# Patient Record
Sex: FEMALE | Race: WHITE | NOT HISPANIC OR LATINO | URBAN - METROPOLITAN AREA
[De-identification: names, ages, dates, MRNs, and addresses within clinical notes are randomized per-mention and may not be internally consistent; named-entity substitution may affect disease eponyms.]

---

## 2023-03-14 ENCOUNTER — TELEPHONE (OUTPATIENT)
Dept: PRIMARY CARE | Facility: CLINIC | Age: 28
End: 2023-03-14
Payer: COMMERCIAL

## 2023-03-15 RX ORDER — HYDROCORTISONE 25 MG/G
CREAM TOPICAL
COMMUNITY
Start: 2023-02-13 | End: 2023-07-17

## 2023-03-15 RX ORDER — TRAZODONE HYDROCHLORIDE 50 MG/1
50 TABLET ORAL NIGHTLY
COMMUNITY
Start: 2023-02-13 | End: 2023-07-17

## 2023-03-15 RX ORDER — ESCITALOPRAM OXALATE 5 MG/1
1 TABLET ORAL DAILY
COMMUNITY
Start: 2023-02-13 | End: 2023-06-16

## 2023-06-16 ENCOUNTER — TELEPHONE (OUTPATIENT)
Dept: PRIMARY CARE | Facility: CLINIC | Age: 28
End: 2023-06-16
Payer: COMMERCIAL

## 2023-06-16 DIAGNOSIS — F41.1 GENERALIZED ANXIETY DISORDER: ICD-10-CM

## 2023-06-16 RX ORDER — ESCITALOPRAM OXALATE 5 MG/1
TABLET ORAL
Qty: 90 TABLET | Refills: 0 | Status: SHIPPED | OUTPATIENT
Start: 2023-06-16 | End: 2023-07-17 | Stop reason: SDUPTHER

## 2023-06-16 NOTE — TELEPHONE ENCOUNTER
Patient was seen in February and told to follow up in 6 weeks because we started her on a new medication.  I do not see a follow up appointment but she keeps calling for refills.  She cannot have anymore refills unless she follows up.

## 2023-07-17 ENCOUNTER — OFFICE VISIT (OUTPATIENT)
Dept: PRIMARY CARE | Facility: CLINIC | Age: 28
End: 2023-07-17
Payer: COMMERCIAL

## 2023-07-17 VITALS
TEMPERATURE: 98.5 F | SYSTOLIC BLOOD PRESSURE: 116 MMHG | RESPIRATION RATE: 16 BRPM | OXYGEN SATURATION: 98 % | HEART RATE: 78 BPM | DIASTOLIC BLOOD PRESSURE: 74 MMHG | BODY MASS INDEX: 22.49 KG/M2 | HEIGHT: 65 IN | WEIGHT: 135 LBS

## 2023-07-17 DIAGNOSIS — F41.1 GENERALIZED ANXIETY DISORDER: ICD-10-CM

## 2023-07-17 DIAGNOSIS — F41.9 ANXIETY: Primary | ICD-10-CM

## 2023-07-17 LAB
ALANINE AMINOTRANSFERASE (SGPT) (U/L) IN SER/PLAS: 10 U/L (ref 7–45)
ALBUMIN (G/DL) IN SER/PLAS: 4.6 G/DL (ref 3.4–5)
ALKALINE PHOSPHATASE (U/L) IN SER/PLAS: 62 U/L (ref 33–110)
ANION GAP IN SER/PLAS: 11 MMOL/L (ref 10–20)
ASPARTATE AMINOTRANSFERASE (SGOT) (U/L) IN SER/PLAS: 16 U/L (ref 9–39)
BILIRUBIN TOTAL (MG/DL) IN SER/PLAS: 0.6 MG/DL (ref 0–1.2)
CALCIUM (MG/DL) IN SER/PLAS: 9.4 MG/DL (ref 8.6–10.3)
CARBON DIOXIDE, TOTAL (MMOL/L) IN SER/PLAS: 28 MMOL/L (ref 21–32)
CHLORIDE (MMOL/L) IN SER/PLAS: 104 MMOL/L (ref 98–107)
CREATININE (MG/DL) IN SER/PLAS: 0.74 MG/DL (ref 0.5–1.05)
ERYTHROCYTE DISTRIBUTION WIDTH (RATIO) BY AUTOMATED COUNT: 12.9 % (ref 11.5–14.5)
ERYTHROCYTE MEAN CORPUSCULAR HEMOGLOBIN CONCENTRATION (G/DL) BY AUTOMATED: 33.3 G/DL (ref 32–36)
ERYTHROCYTE MEAN CORPUSCULAR VOLUME (FL) BY AUTOMATED COUNT: 93 FL (ref 80–100)
ERYTHROCYTES (10*6/UL) IN BLOOD BY AUTOMATED COUNT: 4.54 X10E12/L (ref 4–5.2)
GFR FEMALE: >90 ML/MIN/1.73M2
GLUCOSE (MG/DL) IN SER/PLAS: 79 MG/DL (ref 74–99)
HEMATOCRIT (%) IN BLOOD BY AUTOMATED COUNT: 42.3 % (ref 36–46)
HEMOGLOBIN (G/DL) IN BLOOD: 14.1 G/DL (ref 12–16)
LEUKOCYTES (10*3/UL) IN BLOOD BY AUTOMATED COUNT: 6.2 X10E9/L (ref 4.4–11.3)
PLATELETS (10*3/UL) IN BLOOD AUTOMATED COUNT: 235 X10E9/L (ref 150–450)
POTASSIUM (MMOL/L) IN SER/PLAS: 3.7 MMOL/L (ref 3.5–5.3)
PROTEIN TOTAL: 7.8 G/DL (ref 6.4–8.2)
SODIUM (MMOL/L) IN SER/PLAS: 139 MMOL/L (ref 136–145)
THYROTROPIN (MIU/L) IN SER/PLAS BY DETECTION LIMIT <= 0.05 MIU/L: 1.03 MIU/L (ref 0.44–3.98)
UREA NITROGEN (MG/DL) IN SER/PLAS: 12 MG/DL (ref 6–23)

## 2023-07-17 PROCEDURE — 99214 OFFICE O/P EST MOD 30 MIN: CPT | Performed by: FAMILY MEDICINE

## 2023-07-17 RX ORDER — ESCITALOPRAM OXALATE 5 MG/1
5 TABLET ORAL DAILY
Qty: 90 TABLET | Refills: 0 | Status: SHIPPED | OUTPATIENT
Start: 2023-07-17 | End: 2023-09-11 | Stop reason: SDUPTHER

## 2023-07-17 RX ORDER — HYDROXYZINE HYDROCHLORIDE 25 MG/1
25 TABLET, FILM COATED ORAL 2 TIMES DAILY PRN
Qty: 60 TABLET | Refills: 1 | Status: SHIPPED | OUTPATIENT
Start: 2023-07-17 | End: 2023-09-15
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
## 2023-09-11 ENCOUNTER — OFFICE VISIT (OUTPATIENT)
Dept: PRIMARY CARE | Facility: CLINIC | Age: 28
End: 2023-09-11
Payer: COMMERCIAL

## 2023-09-11 VITALS
HEART RATE: 78 BPM | WEIGHT: 136.4 LBS | BODY MASS INDEX: 22.7 KG/M2 | OXYGEN SATURATION: 98 % | TEMPERATURE: 98.4 F | DIASTOLIC BLOOD PRESSURE: 78 MMHG | RESPIRATION RATE: 18 BRPM | SYSTOLIC BLOOD PRESSURE: 118 MMHG

## 2023-09-11 DIAGNOSIS — F41.1 GENERALIZED ANXIETY DISORDER: ICD-10-CM

## 2023-09-11 DIAGNOSIS — K59.09 CHRONIC CONSTIPATION: Primary | ICD-10-CM

## 2023-09-11 PROCEDURE — 99214 OFFICE O/P EST MOD 30 MIN: CPT | Performed by: FAMILY MEDICINE

## 2023-09-11 PROCEDURE — 1036F TOBACCO NON-USER: CPT | Performed by: FAMILY MEDICINE

## 2023-09-11 RX ORDER — ESCITALOPRAM OXALATE 5 MG/1
7.5 TABLET ORAL DAILY
Qty: 90 TABLET | Refills: 0 | Status: SHIPPED | OUTPATIENT
Start: 2023-09-11 | End: 2023-11-03

## 2023-09-11 ASSESSMENT — PATIENT HEALTH QUESTIONNAIRE - PHQ9
SUM OF ALL RESPONSES TO PHQ9 QUESTIONS 1 AND 2: 0
2. FEELING DOWN, DEPRESSED OR HOPELESS: NOT AT ALL
1. LITTLE INTEREST OR PLEASURE IN DOING THINGS: NOT AT ALL

## 2023-09-11 NOTE — PROGRESS NOTES
"Subjective   Patient ID: Melany Rodríguez is a 28 y.o. female who presents for Anxiety (Per patient anxiety is horrible at work, but decent at home.).    She states that the hydroxyzine was added last time because she gets nauseous with the lexapro.  The nausea has subsided a little.  The anxiety is not improved.  She has gone off on her own with work now.  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  It's very stressful although she performs well.  She doesn't use the hydroxyzine much she gets nervous to take anything.  If she does take it she takes it at night.  She has been on \"everything\" effexor worked the best but she couldn't keep taking it because of brain zaps.  She tried cymbalta as well.  That made her nauseous but the effexor didn't.  She isn't very nauseous taking the lexapro.      A new issue that is going on now is that she has a history of hemorrhoids and constipation.  Now her stool is very thin/skinny.  She is jules if she goes once every 3 days.  She has tried miralax but if she does take it she will go the following day.  She has not tried linzess.    Anxiety             Review of Systems    Objective   /78 (BP Location: Right arm, Patient Position: Sitting)   Pulse 78   Temp 36.9 °C (98.4 °F) (Temporal)   Resp 18   Wt 61.9 kg (136 lb 6.4 oz)   LMP 08/15/2023   SpO2 98%   BMI 22.70 kg/m²     Physical Exam  Constitutional:       Appearance: Normal appearance.   HENT:      Head: Normocephalic and atraumatic.      Mouth/Throat:      Mouth: Mucous membranes are moist.   Eyes:      Pupils: Pupils are equal, round, and reactive to light.   Cardiovascular:      Rate and Rhythm: Normal rate and regular rhythm.      Pulses: Normal pulses.   Pulmonary:      Effort: Pulmonary effort is normal.      Breath sounds: Normal breath sounds.   Abdominal:      General: Bowel sounds are normal. There is no distension.      Palpations: There is no mass.      Tenderness: " There is no abdominal tenderness. There is no guarding.   Musculoskeletal:      Cervical back: Normal range of motion and neck supple.   Neurological:      Mental Status: She is alert.         Assessment/Plan   Diagnoses and all orders for this visit:  Chronic constipation  -     XR abdomen 2 views supine and decubitus; Future  -     linaCLOtide (Linzess) 72 mcg capsule; Take 1 capsule (72 mcg) by mouth once daily in the morning. Take before meals. Do not crush or chew.  Generalized anxiety disorder  -     escitalopram (Lexapro) 5 mg tablet; Take 1.5 tablets (7.5 mg) by mouth once daily.

## 2023-09-11 NOTE — PATIENT INSTRUCTIONS
Today we have addressed your anxiety and you will increase your lexapro to 7.5mg daily, and you can continue hydroxyzine prn.     For your chronic constipation we reviewed your labs, I will order an abdominal xray and I have started you on linzess as long as the xray is normal.     Congratulations on your recent engagement!    Follow up in 6 months for a medication check

## 2023-10-27 ENCOUNTER — ANCILLARY PROCEDURE (OUTPATIENT)
Dept: RADIOLOGY | Facility: CLINIC | Age: 28
End: 2023-10-27
Payer: COMMERCIAL

## 2023-10-27 DIAGNOSIS — K59.09 CHRONIC CONSTIPATION: ICD-10-CM

## 2023-10-27 PROCEDURE — 74019 RADEX ABDOMEN 2 VIEWS: CPT | Performed by: STUDENT IN AN ORGANIZED HEALTH CARE EDUCATION/TRAINING PROGRAM

## 2023-10-27 PROCEDURE — 74019 RADEX ABDOMEN 2 VIEWS: CPT

## 2023-12-11 ENCOUNTER — APPOINTMENT (OUTPATIENT)
Dept: PRIMARY CARE | Facility: CLINIC | Age: 28
End: 2023-12-11
Payer: COMMERCIAL

## 2023-12-29 ENCOUNTER — OFFICE VISIT (OUTPATIENT)
Dept: GASTROENTEROLOGY | Facility: CLINIC | Age: 28
End: 2023-12-29
Payer: COMMERCIAL

## 2023-12-29 DIAGNOSIS — R19.4 CHANGE IN BOWEL HABIT: ICD-10-CM

## 2024-01-17 NOTE — PROGRESS NOTES
"  Melany Rodríguez is a 28 y.o. female with past medical history of anxiety who is referred by PCP Dr. Selena Parker for change in bowels. She reports a longstanding history of constipation for about 10 years. It does not matter what she eats. She has trouble with bowel movements. Associated with abdominal discomfort. Abdominal x-ray 9/2023 showed moderate stool burden. Since August 2023, symptoms have been worse and stools appear much thinner. At its worst about 2 months ago was having bowel movement every 4 days. Was on Miralax at the time. She was prescribed Linzess 72 mcg but only took this once as it caused \"upset stomach\". She states she gets anxiety with medications and does not really like taking them. Unsure if the upset stomach was due to her anxiety or the Linzess. Currently moving bowels every 2 days. Stools sometimes looser than usual. She does not see blood but has external hemorrhoids. She has abdominal pain about 2-3 days out of the week. Pretty constant bloating that is tender. Lots of gas. No nausea or vomiting. Infrequent heartburn. No unintentional weight loss.     Tries to cook meals at home but eats out on weekends. Lots of gluten and dairy. Eats lots of fruits and vegetables. Not much sugar/desserts. Drinks water primarily. Coffee 3 times per week. Not much soda and no energy drinks.    Recent labs reviewed. There is no iron deficiency anemia. CMP unremarkable. TSH normal. She does not take NSAIDS regularly. She has anxiety and was on Lexapro but has not been taking this for 2 months due to nausea. No prior EGD or colonoscopy.    Social history: Engaged.    Family history: Paternal grandfather had ostomy but she does not know why. Denies known family history of colon cancer or other GI disorders or malignancy.     No past medical history on file.    No past surgical history on file.    Current Outpatient Medications   Medication Sig Dispense Refill    copper (Paragard) 380 square mm IUD 1 " each by intrauterine route 1 time.      hydrOXYzine HCL (Atarax) 25 mg tablet Take 1 tablet (25 mg) by mouth 2 times a day as needed for anxiety. 60 tablet 1     No current facility-administered medications for this visit.     No Known Allergies    No family history on file.    Review of Systems  Review of Systems negative except as noted in HPI.    Objective   Unable to perform physical exam due to telemedicine visit.    Assessment/Plan     28 y.o. female with history of anxiety who presents today for initial clinic visit for fluctuating bowels tending towards constipation with abdominal pain with recent worsening of symptoms. This is worse with anxiety and she has been off of her Lexapro for 2 months. Recent labs reassuring with no iron deficiency anemia and normal TSH. Suspect she has underlying irritable bowel syndrome but will exclude other etiologies.    Recommendations  Try to cut back on dairy.  I suspect her anxiety is playing a role and I have asked her to follow up as she may need to go back on medication for this.  Obtain celiac serologies.  Obtain fecal calprotectin. If elevated will need colonoscopy. This was briefly discussed today.  Obtain breath test to exclude SIBO due to her significant bloating. She is at risk for this due to her chronic constipation.  Start fiber supplement daily (Benefiber, Metamucil).  Follow up in 8 weeks, or sooner if needed.    I performed this visit using real-time telehealth tools, including audio/video connection between patient at their home and myself at home.     Electronically signed by: Mellissa Cook CNP on 1/19/2024 at 11:49 AM

## 2024-01-19 ENCOUNTER — TELEMEDICINE (OUTPATIENT)
Dept: GASTROENTEROLOGY | Facility: CLINIC | Age: 29
End: 2024-01-19
Payer: COMMERCIAL

## 2024-01-19 ENCOUNTER — APPOINTMENT (OUTPATIENT)
Dept: INFUSION THERAPY | Facility: CLINIC | Age: 29
End: 2024-01-19
Payer: COMMERCIAL

## 2024-01-19 DIAGNOSIS — R14.0 BLOATING: ICD-10-CM

## 2024-01-19 DIAGNOSIS — R10.9 ABDOMINAL PAIN, UNSPECIFIED ABDOMINAL LOCATION: ICD-10-CM

## 2024-01-19 DIAGNOSIS — K59.09 CHRONIC CONSTIPATION: Primary | ICD-10-CM

## 2024-01-19 DIAGNOSIS — R19.4 CHANGE IN BOWEL HABITS: ICD-10-CM

## 2024-01-19 PROCEDURE — 99204 OFFICE O/P NEW MOD 45 MIN: CPT | Performed by: NURSE PRACTITIONER

## 2024-01-22 ENCOUNTER — LAB (OUTPATIENT)
Dept: LAB | Facility: LAB | Age: 29
End: 2024-01-22
Payer: COMMERCIAL

## 2024-01-22 DIAGNOSIS — K59.09 CHRONIC CONSTIPATION: ICD-10-CM

## 2024-01-22 DIAGNOSIS — R14.0 BLOATING: ICD-10-CM

## 2024-01-22 DIAGNOSIS — R19.4 CHANGE IN BOWEL HABITS: ICD-10-CM

## 2024-01-22 DIAGNOSIS — R10.9 ABDOMINAL PAIN, UNSPECIFIED ABDOMINAL LOCATION: ICD-10-CM

## 2024-01-22 LAB
GLIADIN PEPTIDE IGA SER IA-ACNC: <1 U/ML
TTG IGA SER IA-ACNC: <1 U/ML

## 2024-01-22 PROCEDURE — 83516 IMMUNOASSAY NONANTIBODY: CPT

## 2024-01-22 PROCEDURE — 36415 COLL VENOUS BLD VENIPUNCTURE: CPT

## 2024-01-25 LAB
GLIADIN PEPTIDE IGG SER IA-ACNC: <0.56 FLU (ref 0–4.99)
TTG IGG SER IA-ACNC: <0.82 FLU (ref 0–4.99)

## 2024-01-26 ENCOUNTER — LAB (OUTPATIENT)
Dept: LAB | Facility: LAB | Age: 29
End: 2024-01-26
Payer: COMMERCIAL

## 2024-01-26 DIAGNOSIS — R19.4 CHANGE IN BOWEL HABITS: ICD-10-CM

## 2024-01-26 DIAGNOSIS — R10.9 ABDOMINAL PAIN, UNSPECIFIED ABDOMINAL LOCATION: ICD-10-CM

## 2024-01-26 DIAGNOSIS — R14.0 BLOATING: ICD-10-CM

## 2024-01-26 PROCEDURE — 83993 ASSAY FOR CALPROTECTIN FECAL: CPT

## 2024-01-31 LAB — CALPROTECTIN STL-MCNT: 14 UG/G

## 2024-02-02 ENCOUNTER — OFFICE VISIT (OUTPATIENT)
Dept: PRIMARY CARE | Facility: CLINIC | Age: 29
End: 2024-02-02
Payer: COMMERCIAL

## 2024-02-02 VITALS
TEMPERATURE: 97.9 F | BODY MASS INDEX: 22.3 KG/M2 | SYSTOLIC BLOOD PRESSURE: 108 MMHG | RESPIRATION RATE: 16 BRPM | DIASTOLIC BLOOD PRESSURE: 66 MMHG | OXYGEN SATURATION: 97 % | WEIGHT: 134 LBS | HEART RATE: 75 BPM

## 2024-02-02 DIAGNOSIS — S03.00XS DISLOCATION OF TEMPOROMANDIBULAR JOINT, SEQUELA: ICD-10-CM

## 2024-02-02 DIAGNOSIS — G47.00 INSOMNIA, UNSPECIFIED TYPE: Primary | ICD-10-CM

## 2024-02-02 DIAGNOSIS — F45.8 BRUXISM (TEETH GRINDING): ICD-10-CM

## 2024-02-02 PROBLEM — S03.00XA DISLOCATION OF JAW: Status: ACTIVE | Noted: 2024-02-02

## 2024-02-02 PROCEDURE — 99214 OFFICE O/P EST MOD 30 MIN: CPT | Performed by: FAMILY MEDICINE

## 2024-02-02 PROCEDURE — 1036F TOBACCO NON-USER: CPT | Performed by: FAMILY MEDICINE

## 2024-02-02 NOTE — PATIENT INSTRUCTIONS
Today we have addressed your insomnia, TMJ and teeth grinding    I have advised that you see your dentist for a new type of mouth guard to help decrease your grinding.     We talked about anxiety/depression and you are seeing a counselor but do not feel depressed at this time    I have referred you to a sleep medicine specialist.     Follow up if no improvement or if symptoms worsen.    Please make an appointment to follow up for your Annual Physical.

## 2024-03-07 ENCOUNTER — CLINICAL SUPPORT (OUTPATIENT)
Dept: GASTROENTEROLOGY | Facility: CLINIC | Age: 29
End: 2024-03-07
Payer: COMMERCIAL

## 2024-03-07 VITALS
BODY MASS INDEX: 22.49 KG/M2 | SYSTOLIC BLOOD PRESSURE: 134 MMHG | HEIGHT: 65 IN | TEMPERATURE: 98.3 F | DIASTOLIC BLOOD PRESSURE: 69 MMHG | HEART RATE: 86 BPM | WEIGHT: 135 LBS

## 2024-03-07 DIAGNOSIS — K59.09 CHRONIC CONSTIPATION: Primary | ICD-10-CM

## 2024-03-07 PROCEDURE — 91065 BREATH HYDROGEN/METHANE TEST: CPT | Performed by: NURSE PRACTITIONER

## 2024-03-07 NOTE — PROGRESS NOTES
Patient ID: Melany Rodríguez is a 28 y.o. female.    Breath Hydrogen Test-Bacterial Overgrowth    Date/Time: 3/7/2024 1:20 PM    Performed by: Elsy Navarrete MA  Authorized by: SIMÓN Morrison    Consent:     Consent obtained:  Verbal    Consent given by:  Patient  Hydrogen Breath Analysis Consultation Sheet    Referring Provider:   Indication: Rule out SIBO    Symptoms: Abdominal Pain    Age: 28 y.o.  Weight:   Vitals:    03/07/24 1319   Weight: 61.2 kg (135 lb)     Substrate: Dextrose   Dose: 75 mg    Last Meal: Rice Tortilla with tomatoes after 1:15 (first reading was good, so continued with the test)  Recent Antibiotics: Patient denies    RESULTS:   Time PPM (H2) APPM* (CH4) CO2 Correction   Baseline #1 1:10 3 6 3.6 1.52   Baseline #2 1:13 2 6 3.4 1.61   *Challenge Dose Sugar: 1:15  15' 1:30 3 6 3.5 1.57   30' 1:45 4 6 3.7 1.48   45' 2:00 3 6 3.7 1.48   60' 2:15 3 6 3.5 1.57   75' 2:30 3 6 3.6 1.52   90' 2:45 3 6 3.5 1.57   105' 3:00 3 6 3.4 1.61   120' 3:15 3 8 3.3 1.66   135' 3:30 5 7 3.3 1.66   150'        165'        180'          Impression: Negative for SIBO

## 2024-03-07 NOTE — LETTER
March 28, 2024     BRENNAN Morrison-DESHAWN  [address]  Department Of Medicine-Gastroenterology  [facility] [address]    Patient: Melany Rodríguez   YOB: 1995   Date of Visit: 3/7/2024       Dear Dr. Mellissa Cook, BRENNAN-CNP:    Thank you for referring Melany Rodríguez to me for evaluation. Below are my notes for this consultation.  If you have questions, please do not hesitate to call me. I look forward to following your patient along with you.       Sincerely,     [id number] NURSE      CC: No Recipients  ______________________________________________________________________________________

## 2024-05-08 ENCOUNTER — OFFICE VISIT (OUTPATIENT)
Dept: PRIMARY CARE | Facility: CLINIC | Age: 29
End: 2024-05-08
Payer: COMMERCIAL

## 2024-05-08 VITALS
TEMPERATURE: 98.4 F | RESPIRATION RATE: 16 BRPM | DIASTOLIC BLOOD PRESSURE: 80 MMHG | OXYGEN SATURATION: 97 % | SYSTOLIC BLOOD PRESSURE: 112 MMHG | WEIGHT: 138 LBS | BODY MASS INDEX: 22.96 KG/M2 | HEART RATE: 78 BPM

## 2024-05-08 DIAGNOSIS — H60.543 DERMATITIS OF BOTH EAR CANALS: Primary | ICD-10-CM

## 2024-05-08 PROCEDURE — 99213 OFFICE O/P EST LOW 20 MIN: CPT | Performed by: FAMILY MEDICINE

## 2024-05-08 RX ORDER — DEXAMETHASONE SODIUM PHOSPHATE 1 MG/ML
SOLUTION/ DROPS OPHTHALMIC
Qty: 5 ML | Refills: 1 | Status: SHIPPED | OUTPATIENT
Start: 2024-05-08

## 2024-05-08 NOTE — PROGRESS NOTES
Subjective   Patient ID: Melany Rodríguez is a 28 y.o. female who presents for Earache (Pain and itching in both ears; PCP has evaluated before ).    She thinks she has problems when she uses airpods at the gym.  She gets a dull and deep pain and a lot of itching. She has to fight to not scratch the inside of the ears.  She notices when she increases her workouts it gets worse.  She doesn't have any discharge.  She has never tried antihistamine and she hasn't had any trouble hearing.  No ringing in the ears either.      Earache          Review of Systems   HENT:  Positive for ear pain.        Objective   /80   Pulse 78   Temp 36.9 °C (98.4 °F)   Resp 16   Wt 62.6 kg (138 lb)   SpO2 97%   BMI 22.96 kg/m²     Physical Exam  Constitutional:       Appearance: Normal appearance.   HENT:      Head: Normocephalic.      Right Ear: Tympanic membrane normal.      Left Ear: Tympanic membrane normal. There is impacted cerumen.      Nose: No congestion or rhinorrhea.   Eyes:      Pupils: Pupils are equal, round, and reactive to light.   Cardiovascular:      Rate and Rhythm: Normal rate and regular rhythm.   Pulmonary:      Effort: Pulmonary effort is normal. No respiratory distress.      Breath sounds: Normal breath sounds. No wheezing.   Musculoskeletal:      Cervical back: Normal range of motion.   Skin:     General: Skin is warm.   Neurological:      General: No focal deficit present.      Mental Status: She is alert and oriented to person, place, and time.   Psychiatric:         Mood and Affect: Mood normal.         Assessment/Plan   Diagnoses and all orders for this visit:  Dermatitis of both ear canals  -     dexAMETHasone (Decadron) 0.1 % ophthalmic solution; Put 1 drop each ear twice daily x 7 days  -     Referral to ENT; Future

## 2024-05-08 NOTE — PATIENT INSTRUCTIONS
Today we have addressed you ear dermatitis  I recommended trying just a steroid ear drop twice daily to clear up the itching as well as oral antihistamines otc.    I also recommend that you see ENT because this has been an ongoing issue and especially when you wear earpods, hopefully they can shed some light as to how to prevent this.

## 2024-07-09 ENCOUNTER — OFFICE VISIT (OUTPATIENT)
Dept: PRIMARY CARE | Facility: CLINIC | Age: 29
End: 2024-07-09
Payer: COMMERCIAL

## 2024-07-09 VITALS
DIASTOLIC BLOOD PRESSURE: 80 MMHG | BODY MASS INDEX: 22.47 KG/M2 | TEMPERATURE: 97.4 F | RESPIRATION RATE: 16 BRPM | WEIGHT: 135 LBS | HEART RATE: 88 BPM | OXYGEN SATURATION: 98 % | SYSTOLIC BLOOD PRESSURE: 114 MMHG

## 2024-07-09 DIAGNOSIS — J02.9 SORE THROAT: ICD-10-CM

## 2024-07-09 DIAGNOSIS — R05.1 ACUTE COUGH: Primary | ICD-10-CM

## 2024-07-09 LAB — POC RAPID STREP: NEGATIVE

## 2024-07-09 PROCEDURE — 87811 SARS-COV-2 COVID19 W/OPTIC: CPT | Performed by: NURSE PRACTITIONER

## 2024-07-09 PROCEDURE — 87081 CULTURE SCREEN ONLY: CPT

## 2024-07-09 PROCEDURE — 99214 OFFICE O/P EST MOD 30 MIN: CPT | Performed by: NURSE PRACTITIONER

## 2024-07-09 PROCEDURE — 1036F TOBACCO NON-USER: CPT | Performed by: NURSE PRACTITIONER

## 2024-07-09 PROCEDURE — 87880 STREP A ASSAY W/OPTIC: CPT | Performed by: NURSE PRACTITIONER

## 2024-07-09 RX ORDER — BENZONATATE 100 MG/1
100 CAPSULE ORAL 3 TIMES DAILY PRN
Qty: 42 CAPSULE | Refills: 0 | Status: SHIPPED | OUTPATIENT
Start: 2024-07-09 | End: 2024-08-08

## 2024-07-09 RX ORDER — ALBUTEROL SULFATE 90 UG/1
2 AEROSOL, METERED RESPIRATORY (INHALATION) EVERY 4 HOURS PRN
Qty: 8.5 G | Refills: 0 | Status: SHIPPED | OUTPATIENT
Start: 2024-07-09 | End: 2025-07-09

## 2024-07-09 ASSESSMENT — ENCOUNTER SYMPTOMS
FATIGUE: 1
CHILLS: 1
SHORTNESS OF BREATH: 0
NAUSEA: 0
MYALGIAS: 1
COUGH: 1
DIARRHEA: 0
HEADACHES: 1
VOMITING: 0
WHEEZING: 0
PALPITATIONS: 0
CONSTIPATION: 0
FEVER: 0
SORE THROAT: 1

## 2024-07-09 NOTE — PROGRESS NOTES
Subjective   Patient ID: Melany Rodríguez is a 28 y.o. female who presents for Sore Throat.  She had 2 negative home COVID tests, most recently on 7/5/24.   Sore Throat   Episode onset: Friday. Associated symptoms include congestion (very mild), coughing (dry, started to get bad last night and she couldn't sleep.) and headaches (migraine today (she gets them if she doesn't sleep well.). Pertinent negatives include no diarrhea, ear pain, shortness of breath or vomiting.   She is using Dayquil with temporary improvement.     Review of Systems   Constitutional:  Positive for chills and fatigue. Negative for fever.   HENT:  Positive for congestion (very mild) and sore throat. Negative for ear pain.    Respiratory:  Positive for cough (dry, started to get bad last night and she couldn't sleep.). Negative for shortness of breath and wheezing.    Cardiovascular:  Positive for chest pain (sharp/shooting on right side yesterday, lasted 30 minutes and resolved on its own.). Negative for palpitations and leg swelling.   Gastrointestinal:  Negative for constipation, diarrhea, nausea and vomiting.   Musculoskeletal:  Positive for myalgias (body aches).   Neurological:  Positive for headaches (migraine today (she gets them if she doesn't sleep well.).   She states her symptoms start to improve and then worsen again.   Objective   /80   Pulse 88   Temp 36.3 °C (97.4 °F)   Resp 16   Wt 61.2 kg (135 lb)   SpO2 98%   BMI 22.47 kg/m²   Physical Exam  Vitals reviewed.   Constitutional:       Appearance: Normal appearance. She is ill-appearing.   HENT:      Head: Normocephalic.   Eyes:      Conjunctiva/sclera: Conjunctivae normal.   Cardiovascular:      Rate and Rhythm: Normal rate and regular rhythm.      Pulses: Normal pulses.   Pulmonary:      Breath sounds: Normal breath sounds. No stridor. No wheezing.   Abdominal:      General: Bowel sounds are normal.      Palpations: Abdomen is soft.   Musculoskeletal:      Cervical  back: Neck supple.   Skin:     General: Skin is warm and dry.   Neurological:      General: No focal deficit present.      Mental Status: She is alert and oriented to person, place, and time.   Psychiatric:         Mood and Affect: Mood normal.         Thought Content: Thought content normal.     Assessment/Plan   1. Acute cough  albuterol (ProAir HFA) 90 mcg/actuation inhaler    inhalational spacing device inhaler    benzonatate (Tessalon) 100 mg capsule    Her cough led to a bronchospasm. Will order PRN albuterol      2. Sore throat  POCT Rapid Strep A manually resulted    POCT BinaxNOW Covid-19 Ag Card manually resulted    Group A Streptococcus, Culture        Rapid strip and COVID both negative.

## 2024-07-11 ENCOUNTER — TELEPHONE (OUTPATIENT)
Dept: PRIMARY CARE | Facility: CLINIC | Age: 29
End: 2024-07-11
Payer: COMMERCIAL

## 2024-07-11 ENCOUNTER — PATIENT MESSAGE (OUTPATIENT)
Dept: PRIMARY CARE | Facility: CLINIC | Age: 29
End: 2024-07-11
Payer: COMMERCIAL

## 2024-07-11 DIAGNOSIS — R05.1 ACUTE COUGH: Primary | ICD-10-CM

## 2024-07-11 RX ORDER — AZITHROMYCIN 250 MG/1
TABLET, FILM COATED ORAL
Qty: 6 TABLET | Refills: 0 | Status: SHIPPED | OUTPATIENT
Start: 2024-07-11 | End: 2024-07-16

## 2024-07-11 NOTE — TELEPHONE ENCOUNTER
Pt called and said she has not had any improvement since her appt on 7/9. She said she has not been able to sleep in two days and her cough turned productive within the last 24 hours. She would like to know if she should start an antibiotic.

## 2024-07-12 LAB — S PYO THROAT QL CULT: NORMAL

## 2024-11-04 ENCOUNTER — OFFICE VISIT (OUTPATIENT)
Dept: PRIMARY CARE | Facility: CLINIC | Age: 29
End: 2024-11-04
Payer: COMMERCIAL

## 2024-11-04 VITALS
OXYGEN SATURATION: 98 % | TEMPERATURE: 98.2 F | RESPIRATION RATE: 16 BRPM | HEIGHT: 65 IN | WEIGHT: 141 LBS | HEART RATE: 76 BPM | DIASTOLIC BLOOD PRESSURE: 70 MMHG | BODY MASS INDEX: 23.49 KG/M2 | SYSTOLIC BLOOD PRESSURE: 114 MMHG

## 2024-11-04 DIAGNOSIS — N76.1 SUBACUTE VAGINITIS: ICD-10-CM

## 2024-11-04 DIAGNOSIS — R00.2 PALPITATIONS: Primary | ICD-10-CM

## 2024-11-04 PROCEDURE — 99214 OFFICE O/P EST MOD 30 MIN: CPT | Performed by: FAMILY MEDICINE

## 2024-11-04 PROCEDURE — 87205 SMEAR GRAM STAIN: CPT

## 2024-11-04 PROCEDURE — 3008F BODY MASS INDEX DOCD: CPT | Performed by: FAMILY MEDICINE

## 2024-11-04 PROCEDURE — 93000 ELECTROCARDIOGRAM COMPLETE: CPT | Performed by: FAMILY MEDICINE

## 2024-11-04 NOTE — PATIENT INSTRUCTIONS
Today we have addressed your palpitations and your EKG was essentially normal so I have ordered labs and holter monitor.    I also placed a cardiology referral.      I also addressed your vaginitis and we did cultures again for BV/yeast since obgyn had only done a BV culture.      Follow up when results received.  Probiotic femdophilus recommended

## 2024-11-04 NOTE — PROGRESS NOTES
"Current Outpatient Medications   Medication Sig Dispense Refill    albuterol (ProAir HFA) 90 mcg/actuation inhaler Inhale 2 puffs every 4 hours if needed for wheezing or shortness of breath. 8.5 g 0    copper (Paragard) 380 square mm IUD 1 each by intrauterine route 1 time.      inhalational spacing device inhaler Use as directed with inhalers 1 each 0     No current facility-administered medications for this visit.   Subjective   Patient ID: Melany Rodríguez is a 29 y.o. female who presents for Irregular Heart Beat (Happening more often ).    She states that for awhile she will experience a thick, odd, thumping in her chest.  The worst was 1 month ago she was sitting at her desk working and it started really bad, she leaned over in her chair an coughed.  It will sometimes go a couple of beats and then stop.  She thinks maybe she feels lightheaded when this happens.  She denies chest pain, no pressure but maybe a tightness.  She feels sob when it happens.  She denies a headache at this time and this isn't at the same time as her migraine medication either.  It's really quick moments.  It is not associated with the use of the albuterol.  That was only used when she was sick.  It has increased to the point where she might feel it at least once a week up to 3 times a week.      She has had yeast infection symptoms.  She did take otc medication.  Has irritation and discharge.  She saw gyno and was tested for BV.  She just became sexually active because she got . Isn't using condoms.           Review of Systems    Objective   /70   Pulse 76   Temp 36.8 °C (98.2 °F)   Resp 16   Ht 1.651 m (5' 5\")   Wt 64 kg (141 lb)   SpO2 98%   BMI 23.46 kg/m²     Physical Exam  Constitutional:       Appearance: Normal appearance.   HENT:      Head: Normocephalic.   Eyes:      Pupils: Pupils are equal, round, and reactive to light.   Cardiovascular:      Rate and Rhythm: Normal rate and regular rhythm.   Pulmonary:     "  Effort: Pulmonary effort is normal.      Breath sounds: Normal breath sounds.   Genitourinary:     General: Normal vulva.      Vagina: No vaginal discharge.   Musculoskeletal:      Cervical back: Normal range of motion.   Skin:     General: Skin is warm.   Neurological:      General: No focal deficit present.      Mental Status: She is alert and oriented to person, place, and time.   Psychiatric:         Mood and Affect: Mood normal.         Assessment/Plan   Diagnoses and all orders for this visit:  Palpitations  -     ECG 12 Lead  -     CBC and Auto Differential; Future  -     Comprehensive Metabolic Panel; Future  -     TSH with reflex to Free T4 if abnormal; Future  -     Magnesium; Future  -     Holter Monitor >48 Hours - 7 Days - No Charges; Future  -     Referral to Cardiology; Future  Subacute vaginitis  -     Vaginitis Gram Stain For Bacterial Vaginosis + Yeast; Future

## 2024-11-07 LAB
CLUE CELLS VAG LPF-#/AREA: NORMAL /[LPF]
NUGENT SCORE: 1
YEAST VAG WET PREP-#/AREA: NORMAL

## 2024-12-14 ENCOUNTER — HOSPITAL ENCOUNTER (OUTPATIENT)
Dept: CARDIOLOGY | Facility: HOSPITAL | Age: 29
Discharge: HOME | End: 2024-12-14
Payer: COMMERCIAL

## 2024-12-14 DIAGNOSIS — R00.2 PALPITATIONS: ICD-10-CM

## 2024-12-14 PROCEDURE — 93242 EXT ECG>48HR<7D RECORDING: CPT

## 2024-12-20 ENCOUNTER — APPOINTMENT (OUTPATIENT)
Dept: CARDIOLOGY | Facility: CLINIC | Age: 29
End: 2024-12-20
Payer: COMMERCIAL

## 2024-12-20 VITALS
HEIGHT: 65 IN | OXYGEN SATURATION: 96 % | DIASTOLIC BLOOD PRESSURE: 76 MMHG | WEIGHT: 141 LBS | HEART RATE: 70 BPM | BODY MASS INDEX: 23.49 KG/M2 | SYSTOLIC BLOOD PRESSURE: 117 MMHG

## 2024-12-20 DIAGNOSIS — R00.2 PALPITATIONS: ICD-10-CM

## 2024-12-20 PROCEDURE — 99203 OFFICE O/P NEW LOW 30 MIN: CPT | Performed by: INTERNAL MEDICINE

## 2024-12-20 PROCEDURE — 3008F BODY MASS INDEX DOCD: CPT | Performed by: INTERNAL MEDICINE

## 2024-12-20 PROCEDURE — 93000 ELECTROCARDIOGRAM COMPLETE: CPT | Performed by: INTERNAL MEDICINE

## 2024-12-20 PROCEDURE — G2211 COMPLEX E/M VISIT ADD ON: HCPCS | Performed by: INTERNAL MEDICINE

## 2024-12-20 PROCEDURE — 1036F TOBACCO NON-USER: CPT | Performed by: INTERNAL MEDICINE

## 2024-12-20 ASSESSMENT — PAIN SCALES - GENERAL: PAINLEVEL_OUTOF10: 0-NO PAIN

## 2024-12-20 NOTE — PROGRESS NOTES
"  Referring Provider: Selena Peterson DO  Reason for Consult: Palpitations    History of Present Illness:      Melany Rodríguez is a 29 y.o. year old female patient with no significant past medical history who is referred by Dr. Parker for palpitations.    For the past few months she has noticed increasing episodes of palpitations.  She describes this as a \"thick\" heartbeat.  Usually will only last for few beats but on occasion has lasted up to 10 to 15 seconds.  She notices that it is worse when she drinks caffeine, usually 2 shots of espresso.  She also notices it during exercise on her exercise bike.  She denies any chest pain, shortness of breath, syncope, or presyncope.  She wore a 72-hour patch monitor but did not have any symptoms while wearing the monitor.  The results from the monitor are not available yet.      Past Medical and Surgical History:  Ms. Rodríguez  has no past medical history on file.    has no past surgical history on file.    Social History:  Social History     Tobacco Use    Smoking status: Never    Smokeless tobacco: Never   Substance Use Topics    Alcohol use: Not Currently      Tobacco: Denies  Alcohol: Denies  Drug use:  Denies      Relevant Family History:   Family History   Problem Relation Name Age of Onset    Sleep apnea Mother      COPD Father      Sleep apnea Father      Other (Cardiac Arrest) Father          age 65    Obesity Sister      Arrhythmia Sister          age 28    Hypertension Brother       Family History of Sudden Death: Yes Dad passed away in his sleep in 60s. Sister had peripartum cardiomyopathy  Family History of Arrhythmia: YesMother has AF     Allergies:  No Known Allergies     Medications:  Current Outpatient Medications   Medication Instructions    albuterol (ProAir HFA) 90 mcg/actuation inhaler 2 puffs, inhalation, Every 4 hours PRN    copper (Paragard) 380 square mm IUD 1 each, intrauterine, Once    inhalational spacing device inhaler Use as " "directed with inhalers         Objective   Physical Exam:  Last Recorded Vitals:      7/17/2023     2:57 PM 9/11/2023     3:10 PM 2/2/2024     8:15 AM 3/7/2024     1:19 PM 5/8/2024    10:48 AM 7/9/2024     9:37 AM 11/4/2024    11:13 AM   Vitals   Systolic 116 118 108 134 112 114 114   Diastolic 74 78 66 69 80 80 70   BP Location  Right arm  Right arm      Heart Rate 78 78 75 86 78 88 76   Temp 36.9 °C (98.5 °F) 36.9 °C (98.4 °F) 36.6 °C (97.9 °F) 36.8 °C (98.3 °F) 36.9 °C (98.4 °F) 36.3 °C (97.4 °F) 36.8 °C (98.2 °F)   Resp 16 18 16  16 16 16   Height 1.651 m (5' 5\")   1.651 m (5' 5\")   1.651 m (5' 5\")   Weight (lb) 135 136.4 134 135 138 135 141   BMI 22.47 kg/m2 22.7 kg/m2 22.3 kg/m2 22.47 kg/m2 22.96 kg/m2 22.47 kg/m2 23.46 kg/m2   BSA (m2) 1.68 m2 1.68 m2 1.67 m2 1.68 m2 1.69 m2 1.68 m2 1.71 m2   Visit Report Report Report Report  Report Report Report    Visit Vitals  Smoking Status Never      Gen: NAD, sitting comfortably  HEENT: NC/AT  Card: RRR, no m/r/g  Pulm: Clear to auscultation bilaterally  Ext: No LE edema  Neuro: No focal deficits    Diagnostic Results      My Interpretation of Reviewed Study(s):  Prior ECGs (reviewed and my interpretation):   12/20/2024: Normal sinus rhythm, normal ECG, heart rate 67 bpm        Relevant Labs:  Lab Results   Component Value Date    CREATININE 0.74 07/17/2023    K 3.7 07/17/2023    HGB 14.1 07/17/2023    AST 16 07/17/2023    ALT 10 07/17/2023     TSH 1.03 7/2023  Assessment/Plan   Assessment and Plan:  Melany Rodríguez is a 29 y.o. year old female patient who is referred for management and evaluation of palpitations.  Unfortunately, the results of her monitor are not available yet.  By description, her palpitations sound like either PACs or PVCs, but the prolonged episodes may be short episodes of nonsustained SVT.  Regardless, we will need her monitor results to make any definitive conclusions.  Given her symptoms, I think is reasonable to also order an echocardiogram to " make sure that she has a structurally normal heart.    # Palpitations  -Await results of the monitor,.  If nothing is seen on the monitor then would repeat with a longer interval (probably 14-day monitor)  - TTE complete  - Follow-up in 1 month           Return to Clinic: Patient should return to the EP Clinic in 1 month     Thank you very much for allowing me to participate in the care of this patient. Please do not hesitate to contact me with any further questions or concerns.    Ana Laura Alexis MD  Clinical Cardiac Electrophysiologist, [facility] [place]    of Medicine, [facility] [employer] [employer]  Director of Atrial Fibrillation Ablation, [facility]  Director of Ventricular Arrhythmias Research, [facility]  Office Phone Number: [phone]

## 2025-01-09 ENCOUNTER — HOSPITAL ENCOUNTER (OUTPATIENT)
Dept: CARDIOLOGY | Facility: CLINIC | Age: 30
Discharge: HOME | End: 2025-01-09
Payer: COMMERCIAL

## 2025-01-09 DIAGNOSIS — R00.2 PALPITATIONS: ICD-10-CM

## 2025-01-09 LAB
AORTIC VALVE PEAK VELOCITY: 1.3 M/S
AV PEAK GRADIENT: 7 MMHG
AVA (PEAK VEL): 2.93 CM2
EJECTION FRACTION APICAL 4 CHAMBER: 57.1
EJECTION FRACTION: 58 %
LEFT ATRIUM VOLUME AREA LENGTH INDEX BSA: 21.6 ML/M2
LEFT VENTRICLE INTERNAL DIMENSION DIASTOLE: 4.42 CM (ref 3.5–6)
LEFT VENTRICULAR OUTFLOW TRACT DIAMETER: 2 CM
MITRAL VALVE E/A RATIO: 1.2
RIGHT VENTRICLE FREE WALL PEAK S': 14 CM/S
TRICUSPID ANNULAR PLANE SYSTOLIC EXCURSION: 2.2 CM

## 2025-01-09 PROCEDURE — 93306 TTE W/DOPPLER COMPLETE: CPT | Performed by: INTERNAL MEDICINE

## 2025-01-09 PROCEDURE — 93306 TTE W/DOPPLER COMPLETE: CPT

## 2025-01-15 ENCOUNTER — APPOINTMENT (OUTPATIENT)
Dept: OTOLARYNGOLOGY | Facility: CLINIC | Age: 30
End: 2025-01-15
Payer: COMMERCIAL

## 2025-01-15 DIAGNOSIS — H60.8X3 CHRONIC ECZEMATOID OTITIS EXTERNA OF BOTH EARS: Primary | ICD-10-CM

## 2025-01-15 DIAGNOSIS — R09.89 THROAT TIGHTNESS: ICD-10-CM

## 2025-01-15 PROCEDURE — 99203 OFFICE O/P NEW LOW 30 MIN: CPT | Performed by: OTOLARYNGOLOGY

## 2025-01-15 RX ORDER — MOMETASONE FUROATE 1 MG/G
CREAM TOPICAL 2 TIMES DAILY
Qty: 15 G | Refills: 2 | Status: SHIPPED | OUTPATIENT
Start: 2025-01-15 | End: 2025-01-22

## 2025-01-15 ASSESSMENT — ENCOUNTER SYMPTOMS
CARDIOVASCULAR NEGATIVE: 1
NEUROLOGICAL NEGATIVE: 1
CONSTITUTIONAL NEGATIVE: 1
RESPIRATORY NEGATIVE: 1

## 2025-01-17 ENCOUNTER — APPOINTMENT (OUTPATIENT)
Dept: CARDIOLOGY | Facility: CLINIC | Age: 30
End: 2025-01-17
Payer: COMMERCIAL

## 2025-01-17 VITALS
SYSTOLIC BLOOD PRESSURE: 112 MMHG | OXYGEN SATURATION: 97 % | DIASTOLIC BLOOD PRESSURE: 72 MMHG | BODY MASS INDEX: 23.66 KG/M2 | HEIGHT: 65 IN | WEIGHT: 142 LBS | HEART RATE: 72 BPM

## 2025-01-17 DIAGNOSIS — R00.2 PALPITATIONS: Primary | ICD-10-CM

## 2025-01-17 PROCEDURE — 93000 ELECTROCARDIOGRAM COMPLETE: CPT | Performed by: INTERNAL MEDICINE

## 2025-01-17 PROCEDURE — 3008F BODY MASS INDEX DOCD: CPT | Performed by: INTERNAL MEDICINE

## 2025-01-17 PROCEDURE — 99213 OFFICE O/P EST LOW 20 MIN: CPT | Performed by: INTERNAL MEDICINE

## 2025-01-17 PROCEDURE — 1036F TOBACCO NON-USER: CPT | Performed by: INTERNAL MEDICINE

## 2025-01-17 PROCEDURE — G2211 COMPLEX E/M VISIT ADD ON: HCPCS | Performed by: INTERNAL MEDICINE

## 2025-01-17 ASSESSMENT — PAIN SCALES - GENERAL: PAINLEVEL_OUTOF10: 0-NO PAIN

## 2025-01-17 NOTE — PROGRESS NOTES
"  Referring Provider: Ana Laura Alexis MD  Reason for Consult: Palpitations    History of Present Illness:      Melany Boyer is a 29 y.o. year old female patient with no significant past medical history who is referred by Dr. Parker for palpitations.    For the past few months she has noticed increasing episodes of palpitations.  She describes this as a \"thick\" heartbeat.  Usually will only last for few beats but on occasion has lasted up to 10 to 15 seconds.  She notices that it is worse when she drinks caffeine, usually 2 shots of espresso.  She also notices it during exercise on her exercise bike.  She denies any chest pain, shortness of breath, syncope, or presyncope.  She wore a 72-hour patch monitor but did not have any symptoms while wearing the monitor.     After her last visit, the results of the monitor have come back.  There was no significant rhythm abnormalities on the monitor.  However, since she did not have any symptoms during the monitor, we discussed repeating the monitor.  She also had an echocardiogram which was normal.      Past Medical and Surgical History:  Ms. Boyer  has no past medical history on file.    has no past surgical history on file.    Social History:  Social History     Tobacco Use    Smoking status: Never    Smokeless tobacco: Never   Substance Use Topics    Alcohol use: Not Currently     Comment: social      Tobacco: Denies  Alcohol: Denies  Drug use:  Denies      Relevant Family History:   Family History   Problem Relation Name Age of Onset    Sleep apnea Mother      COPD Father      Sleep apnea Father      Other (Cardiac Arrest) Father          age 65    Obesity Sister      Arrhythmia Sister          age 28    Hypertension Brother       Family History of Sudden Death: Yes Dad passed away in his sleep in 60s. Sister had peripartum cardiomyopathy  Family History of Arrhythmia: YesMother has AF     Allergies:  Allergies   Allergen Reactions    Duloxetine Other " "   Latex Itching    Sumatriptan Other    Venlafaxine Unknown     Other reaction(s): \"brain zaps\"        Medications:  Current Outpatient Medications   Medication Instructions    copper (Paragard) 380 square mm IUD 1 each, Once    mometasone (Elocon) 0.1 % cream Topical, 2 times daily         Objective   Physical Exam:  Last Recorded Vitals:      2/2/2024     8:15 AM 3/7/2024     1:19 PM 5/8/2024    10:48 AM 7/9/2024     9:37 AM 11/4/2024    11:13 AM 12/20/2024     4:05 PM 1/17/2025     3:58 PM   Vitals   Systolic 108 134 112 114 114 117 112   Diastolic 66 69 80 80 70 76 72   BP Location  Right arm    Left arm Right arm   Heart Rate 75 86 78 88 76 70 72   Temp 36.6 °C (97.9 °F) 36.8 °C (98.3 °F) 36.9 °C (98.4 °F) 36.3 °C (97.4 °F) 36.8 °C (98.2 °F)     Resp 16  16 16 16     Height  1.651 m (5' 5\")   1.651 m (5' 5\") 1.651 m (5' 5\") 1.651 m (5' 5\")   Weight (lb) 134 135 138 135 141 141 142   BMI 22.3 kg/m2 22.47 kg/m2 22.96 kg/m2 22.47 kg/m2 23.46 kg/m2 23.46 kg/m2 23.63 kg/m2   BSA (m2) 1.67 m2 1.68 m2 1.69 m2 1.68 m2 1.71 m2 1.71 m2 1.72 m2   Visit Report Report  Report Report Report Report Report    Visit Vitals  /72 (BP Location: Right arm, Patient Position: Sitting, BP Cuff Size: Adult)   Pulse 72   Ht 1.651 m (5' 5\")   Wt 64.4 kg (142 lb)   SpO2 97%   BMI 23.63 kg/m²   Smoking Status Never   BSA 1.72 m²      Gen: NAD, sitting comfortably  HEENT: NC/AT  Card: RRR, no m/r/g  Pulm: Clear to auscultation bilaterally  Ext: No LE edema  Neuro: No focal deficits    Diagnostic Results      My Interpretation of Reviewed Study(s):  Prior ECGs (reviewed and my interpretation):   12/20/2024: Normal sinus rhythm, normal ECG, heart rate 67 bpm    Echocardiogram:  1/9/2025: Normal LVEF    Cardiac event monitor  12/2024: 72-hour monitor, no significant arrhythmia, normal monitor    Relevant Labs:  Lab Results   Component Value Date    CREATININE 0.74 07/17/2023    K 3.7 07/17/2023    HGB 14.1 07/17/2023    AST 16 " 07/17/2023    ALT 10 07/17/2023     TSH 1.03 7/2023  Assessment/Plan   Assessment and Plan:  Melany Boyer is a 29 y.o. year old female patient who is referred for management and evaluation of palpitations.  Unfortunately, the results of her monitor are not available yet.  By description, her palpitations sound like either PACs or PVCs, but the prolonged episodes may be short episodes of nonsustained SVT.  Her monitor did not show any significant rhythm abnormalities but also she did not have any symptoms of these episodes while on the monitor.  She is still having episodes about once to twice a week.  Echocardiogram showed a structurally normal heart    Will order a 14-day monitor.    # Palpitations  -14-day Zio patch  - Follow-up in 6 weeks, virtual           Return to Clinic: Patient should return to the EP Clinic in 1 month     Thank you very much for allowing me to participate in the care of this patient. Please do not hesitate to contact me with any further questions or concerns.    Ana Laura Alexis MD  Clinical Cardiac Electrophysiologist, [facility] [place]    of Medicine, [facility] [employer]  Director of Atrial Fibrillation Ablation, [facility]  Director of Ventricular Arrhythmias Research, [facility]  Office Phone Number: [phone]